# Patient Record
Sex: MALE | Race: WHITE | NOT HISPANIC OR LATINO | URBAN - METROPOLITAN AREA
[De-identification: names, ages, dates, MRNs, and addresses within clinical notes are randomized per-mention and may not be internally consistent; named-entity substitution may affect disease eponyms.]

---

## 2019-12-03 ENCOUNTER — OFFICE VISIT - HEALTHEAST (OUTPATIENT)
Dept: GERIATRICS | Facility: CLINIC | Age: 74
End: 2019-12-03

## 2019-12-03 DIAGNOSIS — J44.1 CHRONIC OBSTRUCTIVE PULMONARY DISEASE WITH ACUTE EXACERBATION (H): ICD-10-CM

## 2019-12-03 DIAGNOSIS — C34.11 MALIGNANT NEOPLASM OF UPPER LOBE OF RIGHT LUNG (H): ICD-10-CM

## 2019-12-03 DIAGNOSIS — E11.69 TYPE 2 DIABETES MELLITUS WITH OTHER SPECIFIED COMPLICATION, UNSPECIFIED WHETHER LONG TERM INSULIN USE (H): ICD-10-CM

## 2019-12-03 DIAGNOSIS — J96.21 ACUTE ON CHRONIC RESPIRATORY FAILURE WITH HYPOXIA AND HYPERCAPNIA (H): ICD-10-CM

## 2019-12-03 DIAGNOSIS — J96.22 ACUTE ON CHRONIC RESPIRATORY FAILURE WITH HYPOXIA AND HYPERCAPNIA (H): ICD-10-CM

## 2019-12-04 ENCOUNTER — AMBULATORY - HEALTHEAST (OUTPATIENT)
Dept: ADMINISTRATIVE | Facility: CLINIC | Age: 74
End: 2019-12-04

## 2019-12-04 ENCOUNTER — OFFICE VISIT - HEALTHEAST (OUTPATIENT)
Dept: GERIATRICS | Facility: CLINIC | Age: 74
End: 2019-12-04

## 2019-12-04 DIAGNOSIS — N18.30 TYPE 2 DIABETES MELLITUS WITH STAGE 3 CHRONIC KIDNEY DISEASE, WITHOUT LONG-TERM CURRENT USE OF INSULIN (H): ICD-10-CM

## 2019-12-04 DIAGNOSIS — E11.22 TYPE 2 DIABETES MELLITUS WITH STAGE 3 CHRONIC KIDNEY DISEASE, WITHOUT LONG-TERM CURRENT USE OF INSULIN (H): ICD-10-CM

## 2019-12-04 DIAGNOSIS — J96.22 ACUTE ON CHRONIC RESPIRATORY FAILURE WITH HYPOXIA AND HYPERCAPNIA (H): ICD-10-CM

## 2019-12-04 DIAGNOSIS — C34.11 MALIGNANT NEOPLASM OF UPPER LOBE OF RIGHT LUNG (H): ICD-10-CM

## 2019-12-04 DIAGNOSIS — J96.21 ACUTE ON CHRONIC RESPIRATORY FAILURE WITH HYPOXIA AND HYPERCAPNIA (H): ICD-10-CM

## 2019-12-04 RX ORDER — ASCORBIC ACID 500 MG
500 TABLET ORAL DAILY
Status: SHIPPED | COMMUNITY
Start: 2019-12-04

## 2019-12-04 RX ORDER — ALBUTEROL SULFATE 0.83 MG/ML
2.5 SOLUTION RESPIRATORY (INHALATION) EVERY 6 HOURS PRN
Status: SHIPPED | COMMUNITY
Start: 2019-12-04

## 2019-12-04 RX ORDER — CARVEDILOL 6.25 MG/1
6.25 TABLET ORAL 2 TIMES DAILY WITH MEALS
Status: SHIPPED | COMMUNITY
Start: 2019-12-04

## 2019-12-04 RX ORDER — VITAMIN E 268 MG
400 CAPSULE ORAL DAILY
Status: SHIPPED | COMMUNITY
Start: 2019-12-04

## 2019-12-04 RX ORDER — ACETAMINOPHEN 500 MG
500 TABLET ORAL 3 TIMES DAILY
Status: SHIPPED | COMMUNITY
Start: 2019-12-04

## 2019-12-04 RX ORDER — LISINOPRIL 2.5 MG/1
2.5 TABLET ORAL DAILY
Status: SHIPPED | COMMUNITY
Start: 2019-12-04

## 2019-12-10 ENCOUNTER — OFFICE VISIT - HEALTHEAST (OUTPATIENT)
Dept: GERIATRICS | Facility: CLINIC | Age: 74
End: 2019-12-10

## 2019-12-10 DIAGNOSIS — C34.11 MALIGNANT NEOPLASM OF UPPER LOBE OF RIGHT LUNG (H): ICD-10-CM

## 2019-12-10 DIAGNOSIS — J96.21 ACUTE ON CHRONIC RESPIRATORY FAILURE WITH HYPOXIA AND HYPERCAPNIA (H): ICD-10-CM

## 2019-12-10 DIAGNOSIS — E11.22 TYPE 2 DIABETES MELLITUS WITH STAGE 3 CHRONIC KIDNEY DISEASE, WITHOUT LONG-TERM CURRENT USE OF INSULIN (H): ICD-10-CM

## 2019-12-10 DIAGNOSIS — J44.1 CHRONIC OBSTRUCTIVE PULMONARY DISEASE WITH ACUTE EXACERBATION (H): ICD-10-CM

## 2019-12-10 DIAGNOSIS — J96.22 ACUTE ON CHRONIC RESPIRATORY FAILURE WITH HYPOXIA AND HYPERCAPNIA (H): ICD-10-CM

## 2019-12-10 DIAGNOSIS — N18.30 TYPE 2 DIABETES MELLITUS WITH STAGE 3 CHRONIC KIDNEY DISEASE, WITHOUT LONG-TERM CURRENT USE OF INSULIN (H): ICD-10-CM

## 2019-12-12 ENCOUNTER — OFFICE VISIT - HEALTHEAST (OUTPATIENT)
Dept: GERIATRICS | Facility: CLINIC | Age: 74
End: 2019-12-12

## 2019-12-12 DIAGNOSIS — Z79.4 TYPE 2 DIABETES MELLITUS WITH STAGE 3 CHRONIC KIDNEY DISEASE, WITH LONG-TERM CURRENT USE OF INSULIN (H): ICD-10-CM

## 2019-12-12 DIAGNOSIS — C34.11 MALIGNANT NEOPLASM OF UPPER LOBE OF RIGHT LUNG (H): ICD-10-CM

## 2019-12-12 DIAGNOSIS — N18.30 TYPE 2 DIABETES MELLITUS WITH STAGE 3 CHRONIC KIDNEY DISEASE, WITH LONG-TERM CURRENT USE OF INSULIN (H): ICD-10-CM

## 2019-12-12 DIAGNOSIS — J96.21 ACUTE ON CHRONIC RESPIRATORY FAILURE WITH HYPOXIA AND HYPERCAPNIA (H): ICD-10-CM

## 2019-12-12 DIAGNOSIS — E11.22 TYPE 2 DIABETES MELLITUS WITH STAGE 3 CHRONIC KIDNEY DISEASE, WITH LONG-TERM CURRENT USE OF INSULIN (H): ICD-10-CM

## 2019-12-12 DIAGNOSIS — J96.22 ACUTE ON CHRONIC RESPIRATORY FAILURE WITH HYPOXIA AND HYPERCAPNIA (H): ICD-10-CM

## 2019-12-12 RX ORDER — INSULIN GLARGINE 100 [IU]/ML
8 INJECTION, SOLUTION SUBCUTANEOUS AT BEDTIME
Status: SHIPPED | COMMUNITY
Start: 2019-12-12

## 2019-12-17 ENCOUNTER — OFFICE VISIT - HEALTHEAST (OUTPATIENT)
Dept: GERIATRICS | Facility: CLINIC | Age: 74
End: 2019-12-17

## 2019-12-17 DIAGNOSIS — J96.21 ACUTE ON CHRONIC RESPIRATORY FAILURE WITH HYPOXIA AND HYPERCAPNIA (H): ICD-10-CM

## 2019-12-17 DIAGNOSIS — C34.11 MALIGNANT NEOPLASM OF UPPER LOBE OF RIGHT LUNG (H): ICD-10-CM

## 2019-12-17 DIAGNOSIS — N18.30 TYPE 2 DIABETES MELLITUS WITH STAGE 3 CHRONIC KIDNEY DISEASE, WITH LONG-TERM CURRENT USE OF INSULIN (H): ICD-10-CM

## 2019-12-17 DIAGNOSIS — J96.22 ACUTE ON CHRONIC RESPIRATORY FAILURE WITH HYPOXIA AND HYPERCAPNIA (H): ICD-10-CM

## 2019-12-17 DIAGNOSIS — E11.22 TYPE 2 DIABETES MELLITUS WITH STAGE 3 CHRONIC KIDNEY DISEASE, WITH LONG-TERM CURRENT USE OF INSULIN (H): ICD-10-CM

## 2019-12-17 DIAGNOSIS — J44.1 CHRONIC OBSTRUCTIVE PULMONARY DISEASE WITH ACUTE EXACERBATION (H): ICD-10-CM

## 2019-12-17 DIAGNOSIS — Z79.4 TYPE 2 DIABETES MELLITUS WITH STAGE 3 CHRONIC KIDNEY DISEASE, WITH LONG-TERM CURRENT USE OF INSULIN (H): ICD-10-CM

## 2019-12-19 ENCOUNTER — OFFICE VISIT - HEALTHEAST (OUTPATIENT)
Dept: GERIATRICS | Facility: CLINIC | Age: 74
End: 2019-12-19

## 2019-12-19 DIAGNOSIS — I50.32 CHRONIC DIASTOLIC HEART FAILURE (H): ICD-10-CM

## 2019-12-19 DIAGNOSIS — C34.11 MALIGNANT NEOPLASM OF UPPER LOBE OF RIGHT LUNG (H): ICD-10-CM

## 2019-12-19 DIAGNOSIS — J96.21 ACUTE ON CHRONIC RESPIRATORY FAILURE WITH HYPOXIA AND HYPERCAPNIA (H): ICD-10-CM

## 2019-12-19 DIAGNOSIS — J96.22 ACUTE ON CHRONIC RESPIRATORY FAILURE WITH HYPOXIA AND HYPERCAPNIA (H): ICD-10-CM

## 2019-12-23 ENCOUNTER — AMBULATORY - HEALTHEAST (OUTPATIENT)
Dept: GERIATRICS | Facility: CLINIC | Age: 74
End: 2019-12-23

## 2021-06-03 NOTE — PROGRESS NOTES
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  He is found to have multiple complex comorbidities with end-stage COPD as well as lung cancer.  He also has chronic hypoxic respiratory failure with increasing shortness of breath currently discharged 5 L and pulmonary suspects that this may be his new baseline.  He has been given an empiric course of Augmentin for a 5-day regimen.  Also recommended a slow prednisone taper over 2 weeks.  Blood sugars to be monitored in the TCU they are elevated due to prednisone use.  Overall in light of his multiple comorbidities he is not felt to be a treatment candidate for squamous cell carcinoma of his lung.  Hospice care was recommended and patient did meet with hospice in the hospital but so far has deferred hospice.  Outpatient follow-up with pulmonology to discuss further treatment options recommended for him  He has been requested to start using nebulizers but with absolutely not use it as it makes him more hypoxic after treatments.  In addition Spiriva was recommended to be changed to Respimat MDI  He was also requested to start a Trelegy inhaler    An additional 16 minutes were spent reviewing goals of care and CODE STATUS.  He is currently requesting DNR/DNI with selective treatments however he is not ready for  Verified Results  (1) LIPID PANEL, FASTING 14Oct2017 08:09AM Nayla Wyatt     Test Name Result Flag Reference   CHOLESTEROL, TOTAL 167 mg/dL  <200   HDL CHOLESTEROL 55 mg/dL  >50   TRIGLICERIDES 418 mg/dL H <150   LDL-CHOLESTEROL 83 mg/dL (calc)     Reference range: <100     Desirable range <100 mg/dL for patients with CHD or  diabetes and <70 mg/dL for diabetic patients with  known heart disease  LDL-C is now calculated using the Reed-Nelson   calculation, which is a validated novel method providing   better accuracy than the Friedewald equation in the   estimation of LDL-C  Hilda Partida  Shanice Moore  5872;328(20): 8372-4379   (http://MSI/faq/QNR090)   CHOL/HDLC RATIO 3 0 (calc)  <5 0   NON HDL CHOLESTEROL 112 mg/dL (calc)  <130   For patients with diabetes plus 1 major ASCVD risk   factor, treating to a non-HDL-C goal of <100 mg/dL   (LDL-C of <70 mg/dL) is considered a therapeutic   option  (1) COMPREHENSIVE METABOLIC PANEL [id number] 45:83WN Nayla Wyatt     Test Name Result Flag Reference   GLUCOSE 113 mg/dL H 65-99   Fasting reference interval     For someone without known diabetes, a glucose value  between 100 and 125 mg/dL is consistent with  prediabetes and should be confirmed with a  follow-up test    UREA NITROGEN (BUN) 16 mg/dL  7-25   CREATININE 1 01 mg/dL  0 70-1 33   For patients >52years of age, the reference limit  for Creatinine is approximately 13% higher for people  identified as -American  eGFR NON-AFR   AMERICAN 86 mL/min/1 73m2  > OR = 60   eGFR AFRICAN AMERICAN 100 mL/min/1 73m2  > OR = 60   BUN/CREATININE RATIO   6-33   NOT APPLICABLE (calc)   SODIUM 139 mmol/L  135-146   POTASSIUM 4 7 mmol/L  3 5-5 3   CHLORIDE 103 mmol/L     CARBON DIOXIDE 29 mmol/L  20-31   CALCIUM 9 7 mg/dL  8 6-10 3   PROTEIN, TOTAL 7 2 g/dL  6 1-8 1   ALBUMIN 4 6 g/dL  3 6-5 1   GLOBULIN 2 6 g/dL (calc)  1 9-3 7   ALBUMIN/GLOBULIN RATIO 1 8 (calc)  1 0-2 5 BILIRUBIN, TOTAL 1 4 mg/dL H 0 2-1 2   ALKALINE PHOSPHATASE 51 U/L     AST 21 U/L  10-35   ALT 35 U/L  9-46     (Q) HEMOGLOBIN A1c 14Oct2017 08:09AM Shiela Chahal   REPORT COMMENT:  FASTING:YES     Test Name Result Flag Reference   HEMOGLOBIN A1c 5 9 % of total Hgb H <5 7   For someone without known diabetes, a hemoglobin   A1c value between 5 7% and 6 4% is consistent with  prediabetes and should be confirmed with a   follow-up test      For someone with known diabetes, a value <7%  indicates that their diabetes is well controlled  A1c  targets should be individualized based on duration of  diabetes, age, comorbid conditions, and other  considerations  This assay result is consistent with an increased risk  of diabetes  Currently, no consensus exists regarding use of  hemoglobin A1c for diagnosis of diabetes for children  hospice cares as yet  He is aware that he has end-stage COPD as well as lung cancer which is not being treated but would like to discuss further treatment options with his pulmonologist upon follow before making a decision  However family is currently looking at long-term care placement for him and also hospice cares.  Will await for care conference with     History     Patient is a very pleasant 74 y.o. male who is admitted to TCU  Patient presented with acute on chronic hypoxic respiratory failure.  He is on baseline 4 L supplemental oxygen at home.  He has end-stage COPD and sees pulmonary.  He was noted to have presumed COPD exacerbation in the hospital  He also has a recent diagnosis of squamous cell carcinoma of the right lung stage IIIa disease.  He was found to have a right upper lobe mass on CT done in August subsequently PET revealed a malignancy with suspected lymphangitic spread however as he is a marginal candidate he was not offered radiation treatment.  Hospice cares was recommended but patient has declined at present  Has a prior history of being a former smoker.  He is currently abstaining from smoking for the last few years  He was noted to have healthcare associated pneumonia and has been given IV antibiotics in the hospital currently given an empiric course of Augmentin for a 5-day treatment  Patient noted to have progressive shortness of breath especially worsened with exertion and it was felt the TCU would be beneficial to him he is been discharged here    Past Medical History       Past Medical History:   Diagnosis Date     Acute on chronic respiratory failure with hypoxia (H)      Anemia      BPH (benign prostatic hyperplasia)      Bronchiectasis (H)      Chronic diastolic heart failure (H)      Chronic venous insufficiency      Closed fracture of acetabulum (H)      Closed fracture of surgical neck of right humerus      COPD with acute exacerbation (H)      COPD with emphysema  (H)      DM2 (diabetes mellitus, type 2) (H)      Duodenal ulcer with hemorrhage      Gastrointestinal hemorrhage with melena      Hemorrhoids      Hernia of abdominal cavity      Hyperkalemia      Hyponatremia      Obesity      Pulmonary HTN (H)      Pulmonary nodule      Scrotal skin lesion      SOB (shortness of breath)      Squamous cell carcinoma lung (H)        Past Social History     Reviewed, and he  reports that he has quit smoking. He has a 45.00 pack-year smoking history. He has never used smokeless tobacco. He reports current alcohol use.    Family History     Reviewed, and includes MI in his father who  of heart disease in his 70s Sister had breast cancer    Medication List   Post Discharge Medication Reconciliation Status: discharge medications reconciled, continue medications without change   acetaminophen (TYLENOL) 500 MG tablet   Sig: Take 500 mg by mouth 3 (three) times a day.   Class: Historical Med   Route: Oral   albuterol (PROVENTIL) 2.5 mg /3 mL (0.083 %) nebulizer solution   Sig: Take 2.5 mg by nebulization every 6 (six) hours as needed for wheezing.   Class: Historical Med   Route: Nebulization   amoxicillin-clavulanate (AUGMENTIN) 875-125 mg per tablet   Sig: Take 1 tablet by mouth 2 (two) times a day.   Class: Historical Med   Route: Oral   carvedilol (COREG) 6.25 MG tablet   Sig: Take 6.25 mg by mouth 2 (two) times a day with meals.   Class: Historical Med   Route: Oral   lisinopril (PRINIVIL,ZESTRIL) 2.5 MG tablet   Sig: Take 2.5 mg by mouth daily.   Class: Historical Med   Route: Oral   metFORMIN (GLUCOPHAGE) 1000 MG tablet   Sig: Take 1,000 mg by mouth 2 (two) times a day with meals.   Class: Historical Med   Route: Oral   multivitamin (MULTIPLE VITAMIN ORAL)   Sig: Take 1 tablet by mouth daily.   Class: Historical Med   Route: Oral   predniSONE (DELTASONE) 20 MG tablet   Sig: Take 20 mg by mouth 2 (two) times a day.   Class: Historical Med   Route: Oral   predniSONE (DELTASONE) 20  MG tablet   Starting on: 12/10/2019   Sig: Take 20 mg by mouth daily.   Class: Historical Med   Route: Oral   fluticasone-umeclidinium-vilanterol (TRELEGY ELLIPTA) 100-62.5-25 mcg DsDv inhaler   Sig: Inhale 1 Inhalation daily.   Class: Historical Med   Route: Inhalation   ascorbic acid, vitamin C, (ASCORBIC ACID WITH CLEMENT HIPS) 500 MG tablet   Sig: Take 500 mg by mouth daily.   Class: Historical Med   Route: Oral   vitamin E 400 unit capsule   Sig: Take 400 Units by mouth daily.   Class: Historical Med   Route: Oral         Allergies     Allergies   Allergen Reactions     Aspirin      Other reaction(s): GI Bleeding  contraindication       Review of Systems   A comprehensive review of 14 systems was done. Pertinent findings noted here and in history of present illness. All the rest negative.  Constitutional: Negative.  Negative for fever, chills, he has profound activity change, appetite change and fatigue.   HENT: Negative for congestion and facial swelling.    Eyes: Negative for photophobia, redness and visual disturbance.   Respiratory: Negative for cough and chest tightness.  Quite anxious about his oxygen saturations he is refusing movement as he told me that it makes his oxygen saturation dropped and he did sit up he was checking his pulse oximetry  Cardiovascular: Negative for chest pain, palpitations and leg swelling.   Gastrointestinal: Negative for nausea, diarrhea, constipation, blood in stool and abdominal distention.   Genitourinary: Negative.    Musculoskeletal: Negative.  Weak with limited endurance laying in bed refusing to get up as it drops his oxygen saturation  Skin: Negative.    Neurological: Negative for dizziness, tremors, syncope, weakness, light-headedness and headaches.   Hematological: Does not bruise/bleed easily.   Psychiatric/Behavioral: Negative.  Irritable and affect      Physical Exam     Weight is 180 pounds blood pressure 133/78 temp 98 pulse 89    Constitutional: Oriented to  person, place, and time and appears well-developed.   Disabled in appearance  Patient appears to be in moderate respiratory distress he is ranging from 6 to 9 L of oxygen by nasal cannula in the TCU.  Noted to be pursing his mouth to breathe  HEENT:  Normocephalic and atraumatic.  Eyes: Conjunctivae and EOM are normal. Pupils are equal, round, and reactive to light. No discharge.  No scleral icterus. Nose normal. Mouth/Throat: Oropharynx is clear and moist. No oropharyngeal exudate.    NECK: Normal range of motion. Neck supple. No JVD present. No tracheal deviation present. No thyromegaly present.   CARDIOVASCULAR: Normal rate, regular rhythm and intact distal pulses.  Exam reveals no gallop and no friction rub.  Systolic murmur present.  PULMONARY: Effort normal and breath sounds normal. No respiratory distress.No Wheezing or rales.  Diffuse scattered wheezing expiratory especially in the Rt lung noted  ABDOMEN: Soft. Bowel sounds are normal. No distension and no mass.  There is no tenderness. There is no rebound and no guarding. No HSM.  MUSCULOSKELETAL: Normal range of motion. No edema and no tenderness. Mild kyphosis, no tenderness.  LYMPH NODES: Has no cervical, supraclavicular, axillary and groin adenopathy.   NEUROLOGICAL: Alert and oriented to person, place, and time. No cranial nerve deficit.  Normal muscle tone. Coordination normal.   GENITOURINARY: Deferred exam.  SKIN: Skin is warm and dry. No rash noted. No erythema. No pallor.   EXTREMITIES: No cyanosis, no clubbing, no edema. No Deformity.  PSYCHIATRIC: Normal mood, affect and behavior.  Affect is irritable      Lab Results     X-ray chest 11/29/2019 shows right upper lobe mass with significant increase in size interstitial prominence around the tumor concerning for lymphangitic spread versus airspace disease  Bibasilar effusion  Last BMP shows a sodium of 1 2 potassium 5.2 with a chloride of 85 BUN 12 and creatinine of 0.8        GARIMA Butt

## 2021-06-04 VITALS — TEMPERATURE: 96 F | DIASTOLIC BLOOD PRESSURE: 84 MMHG | WEIGHT: 174.6 LBS | SYSTOLIC BLOOD PRESSURE: 139 MMHG

## 2021-06-04 VITALS
TEMPERATURE: 98 F | DIASTOLIC BLOOD PRESSURE: 67 MMHG | WEIGHT: 173.2 LBS | HEART RATE: 97 BPM | SYSTOLIC BLOOD PRESSURE: 105 MMHG

## 2021-06-04 VITALS
HEART RATE: 50 BPM | WEIGHT: 172.4 LBS | DIASTOLIC BLOOD PRESSURE: 72 MMHG | TEMPERATURE: 98.7 F | SYSTOLIC BLOOD PRESSURE: 111 MMHG

## 2021-06-04 NOTE — PROGRESS NOTES
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  He is found to have multiple complex comorbidities with end-stage COPD as well as lung cancer.  Unfortunately remains profoundly debilitated  He is currently bedbound and poorly ambulatory.  Unfortunately extreme fatigue and limited endurance is limited his ability to progress in therapy he states that even minimal movement can cause him extreme degree of shortness of breath and drop in oxygen saturation.  Blood sugars continue to be uncontrolled with blood sugars ranging from 200 to as high as 400 noted in the TCU.  He has refused to take metformin.  He is not a candidate for tight control.  He wanted to review his inhalers and we did look at his Trelegy inhaler as he is quite upset that history was discontinued and we is was reassured that this was not.  I did show him the label on his Trelegy inhaler.  Discharge planning reviewed with him and he is hoping to discharge to assisted living facility soon we are recommending hospice cares for him    History     Patient is a very pleasant 74 y.o. male  who is admitted to TCU  Patient presented with acute on chronic hypoxic respiratory failure.  He is on baseline 4 L supplemental oxygen at home.  Unfortunately has progressive respiratory failure and now on anywhere from 6 to 8 L of oxygen patient is self-monitoring his oxygen saturations  He has end-stage COPD and sees pulmonary.  He was noted to have presumed COPD exacerbation in the hospital  He also has a recent diagnosis of squamous cell carcinoma of the right lung stage IIIa disease.  He was found to have a right upper lobe mass on CT done in August subsequently PET revealed a malignancy with suspected lymphangitic spread however as he is a marginal candidate he was not offered radiation treatment.  Hospice cares was recommended but patient has declined at present  He is not aware when he is supposed to follow with his primary pulmonologist  Has a prior history of being a former smoker.  He is currently abstaining from smoking for the last few years quite upset that his very well was discontinued we did review his the hospital discharge orders and the fact that he is on Trelegy  He was noted to have healthcare associated pneumonia and has been given IV antibiotics in the hospital currently given an empiric course of Augmentin for a 5-day treatment currently he is done with his antibiotics  Patient noted to have progressive weight loss.  He reports that his oral intake is good.  In addition he has diabetes with blood sugars which are ranging from 200-4 00+ he is on insulin unfortunately is being poorly controlled at present.  He told me he does not want to take his metformin at all.  He is looking at an assisted living discharge planning    Past Medical History       Past Medical History:   Diagnosis Date     Acute on chronic respiratory failure with hypoxia (H)      Anemia      BPH (benign prostatic hyperplasia)      Bronchiectasis (H)      Chronic diastolic heart failure (H)      Chronic venous insufficiency       Closed fracture of acetabulum (H)      Closed fracture of surgical neck of right humerus      COPD with acute exacerbation (H)      COPD with emphysema (H)      DM2 (diabetes mellitus, type 2) (H)      Duodenal ulcer with hemorrhage      Gastrointestinal hemorrhage with melena      Hemorrhoids      Hernia of abdominal cavity      Hyperkalemia      Hyponatremia      Obesity      Pulmonary HTN (H)      Pulmonary nodule      Scrotal skin lesion      SOB (shortness of breath)      Squamous cell carcinoma lung (H)        Past Social History     Reviewed, and he  reports that he has quit smoking. He has a 45.00 pack-year smoking history. He has never used smokeless tobacco. He reports current alcohol use.    Family History     Reviewed, and includes MI in his father who  of heart disease in his 70s Sister had breast cancer    Medication List   Post Discharge Medication Reconciliation Status: discharge medications reconciled, continue medications without change   acetaminophen (TYLENOL) 500 MG tablet   Sig: Take 500 mg by mouth 3 (three) times a day.   Class: Historical Med   Route: Oral   albuterol (PROVENTIL) 2.5 mg /3 mL (0.083 %) nebulizer solution   Sig: Take 2.5 mg by nebulization every 6 (six) hours as needed for wheezing.   Class: Historical Med   Route: Nebulization   amoxicillin-clavulanate (AUGMENTIN) 875-125 mg per tablet   Sig: Take 1 tablet by mouth 2 (two) times a day.   Class: Historical Med   Route: Oral   carvedilol (COREG) 6.25 MG tablet   Sig: Take 6.25 mg by mouth 2 (two) times a day with meals.   Class: Historical Med   Route: Oral   lisinopril (PRINIVIL,ZESTRIL) 2.5 MG tablet   Sig: Take 2.5 mg by mouth daily.   Class: Historical Med   Route: Oral   metFORMIN (GLUCOPHAGE) 1000 MG tablet   Sig: Take 1,000 mg by mouth 2 (two) times a day with meals.   Class: Historical Med   Route: Oral   multivitamin (MULTIPLE VITAMIN ORAL)   Sig: Take 1 tablet by mouth daily.   Class: Historical Med   Route:  Oral   predniSONE (DELTASONE) 20 MG tablet   Sig: Take 20 mg by mouth 2 (two) times a day.   Class: Historical Med   Route: Oral   predniSONE (DELTASONE) 20 MG tablet   Starting on: 12/10/2019   Sig: Take 20 mg by mouth daily.   Class: Historical Med   Route: Oral   fluticasone-umeclidinium-vilanterol (TRELEGY ELLIPTA) 100-62.5-25 mcg DsDv inhaler   Sig: Inhale 1 Inhalation daily.   Class: Historical Med   Route: Inhalation   ascorbic acid, vitamin C, (ASCORBIC ACID WITH CLEMENT HIPS) 500 MG tablet   Sig: Take 500 mg by mouth daily.   Class: Historical Med   Route: Oral   vitamin E 400 unit capsule   Sig: Take 400 Units by mouth daily.   Class: Historical Med   Route: Oral         Allergies     Allergies   Allergen Reactions     Aspirin      Other reaction(s): GI Bleeding  contraindication       Review of Systems   A comprehensive review of 14 systems was done. Pertinent findings noted here and in history of present illness. All the rest negative.  Constitutional: Negative.  Negative for fever, chills, he has profound activity change, appetite change and fatigue.   HENT: Negative for congestion and facial swelling.    Eyes: Negative for photophobia, redness and visual disturbance.   Respiratory: Negative for cough and chest tightness.  Quite anxious about his oxygen saturations he is refusing movement as he told me that it makes his oxygen saturation dropped and he did sit up he was checking his pulse oximetry  Cardiovascular: Negative for chest pain, palpitations and leg swelling.   Gastrointestinal: Negative for nausea, diarrhea, constipation, blood in stool and abdominal distention.   Genitourinary: Negative.    Musculoskeletal: Negative.  Weak with limited endurance laying in bed refusing to get up as it drops his oxygen saturation  Skin: Negative.    Neurological: Negative for dizziness, tremors, syncope, weakness, light-headedness and headaches.   Hematological: Does not bruise/bleed easily.    Psychiatric/Behavioral: Negative.  Irritable and affect      Physical Exam     Weight is 173 pounds blood pressure 99 / 54  temp 98 pulse 89    Constitutional: Oriented to person, place, and time and appears well-developed.   Disabled in appearance  Patient appears to be in moderate respiratory distress he is ranging from 6 to 9 L of oxygen by nasal cannula in the TCU.  Noted to be pursing his mouth to breathe  HEENT:  Normocephalic and atraumatic.  Eyes: Conjunctivae and EOM are normal. Pupils are equal, round, and reactive to light. No discharge.  No scleral icterus. Nose normal. Mouth/Throat: Oropharynx is clear and moist. No oropharyngeal exudate.    NECK: Normal range of motion. Neck supple. No JVD present. No tracheal deviation present. No thyromegaly present.   CARDIOVASCULAR: Normal rate, regular rhythm and intact distal pulses.  Exam reveals no gallop and no friction rub.  Systolic murmur present.  PULMONARY: Effort normal and breath sounds normal. No respiratory distress.No Wheezing or rales.  Diffuse scattered wheezing expiratory especially in the Rt lung noted  ABDOMEN: Soft. Bowel sounds are normal. No distension and no mass.  There is no tenderness. There is no rebound and no guarding. No HSM.  MUSCULOSKELETAL: Normal range of motion. No edema and no tenderness. Mild kyphosis, no tenderness.  LYMPH NODES: Has no cervical, supraclavicular, axillary and groin adenopathy.   NEUROLOGICAL: Alert and oriented to person, place, and time. No cranial nerve deficit.  Normal muscle tone. Coordination normal.   GENITOURINARY: Deferred exam.  SKIN: Skin is warm and dry. No rash noted. No erythema. No pallor.   EXTREMITIES: No cyanosis, no clubbing, no edema. No Deformity.  PSYCHIATRIC: Normal mood, affect and behavior.  Affect is irritable      Lab Results     X-ray chest 11/29/2019 shows right upper lobe mass with significant increase in size interstitial prominence around the tumor concerning for lymphangitic  spread versus airspace disease  Bibasilar effusion  Last BMP shows a sodium of 1 2 potassium 5.2 with a chloride of 85 BUN 12 and creatinine of 0.8        GARIMA Butt

## 2021-06-04 NOTE — PROGRESS NOTES
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  He is found to have multiple complex comorbidities with end-stage COPD as well as lung cancer.  Unfortunately remains profoundly debilitated  He is currently done with his antibiotic but remains on a slow prednisone taper.  He requires anywhere from 6 to 8 L of oxygen due to persistent hypoxia.  Baseline functional capacity is limited and he gets deconditioned and short of breath very easily.  He has had persistent weight loss he has been started on supplementation with Glucerna due to which he has been started on Glucerna  Diabetes control is very poor with persistent high blood sugars noted.  He has refused to take metformin  We will empirically start him on Lantus 5 units in the morning in addition to sliding scale coverage to assess control he is not a candidate for tight control but would like to bring all his blood sugars under 200  History     Patient is a very pleasant 74 y.o. male who is admitted to TCU  Patient presented with acute on chronic hypoxic respiratory failure.  He is on baseline 4  L supplemental oxygen at home.  He has end-stage COPD and sees pulmonary.  He was noted to have presumed COPD exacerbation in the hospital  He also has a recent diagnosis of squamous cell carcinoma of the right lung stage IIIa disease.  He was found to have a right upper lobe mass on CT done in August subsequently PET revealed a malignancy with suspected lymphangitic spread however as he is a marginal candidate he was not offered radiation treatment.  Hospice cares was recommended but patient has declined at present  He is not aware when he is supposed to follow with his primary pulmonologist  Has a prior history of being a former smoker.  He is currently abstaining from smoking for the last few years  He was noted to have healthcare associated pneumonia and has been given IV antibiotics in the hospital currently given an empiric course of Augmentin for a 5-day treatment currently he is done with his antibiotics  Patient noted to have progressive shortness of breath especially worsened with exertion and it was felt the TCU would be beneficial to him he is been discharged here  No improvement noted and hypoxia he needs anywhere from 6 to 8 L of oxygen now and remains bedbound  He has had progressive weight loss since admission even though his appetite has been good  Diabetic control is very poor and he is refusing to take his metformin due to diarrhea concerns and stomach upset concerns    Past Medical History       Past Medical History:   Diagnosis Date     Acute on chronic respiratory failure with hypoxia (H)      Anemia      BPH (benign prostatic hyperplasia)      Bronchiectasis (H)      Chronic diastolic heart failure (H)      Chronic venous insufficiency      Closed fracture of acetabulum (H)      Closed fracture of surgical neck of right humerus      COPD with acute exacerbation (H)      COPD with emphysema (H)      DM2 (diabetes mellitus, type 2) (H)      Duodenal ulcer with hemorrhage      Gastrointestinal  hemorrhage with melena      Hemorrhoids      Hernia of abdominal cavity      Hyperkalemia      Hyponatremia      Obesity      Pulmonary HTN (H)      Pulmonary nodule      Scrotal skin lesion      SOB (shortness of breath)      Squamous cell carcinoma lung (H)        Past Social History     Reviewed, and he  reports that he has quit smoking. He has a 45.00 pack-year smoking history. He has never used smokeless tobacco. He reports current alcohol use.    Family History     Reviewed, and includes MI in his father who  of heart disease in his 70s Sister had breast cancer    Medication List   Post Discharge Medication Reconciliation Status: discharge medications reconciled, continue medications without change   acetaminophen (TYLENOL) 500 MG tablet   Sig: Take 500 mg by mouth 3 (three) times a day.   Class: Historical Med   Route: Oral   albuterol (PROVENTIL) 2.5 mg /3 mL (0.083 %) nebulizer solution   Sig: Take 2.5 mg by nebulization every 6 (six) hours as needed for wheezing.   Class: Historical Med   Route: Nebulization   amoxicillin-clavulanate (AUGMENTIN) 875-125 mg per tablet   Sig: Take 1 tablet by mouth 2 (two) times a day.   Class: Historical Med   Route: Oral   carvedilol (COREG) 6.25 MG tablet   Sig: Take 6.25 mg by mouth 2 (two) times a day with meals.   Class: Historical Med   Route: Oral   lisinopril (PRINIVIL,ZESTRIL) 2.5 MG tablet   Sig: Take 2.5 mg by mouth daily.   Class: Historical Med   Route: Oral   metFORMIN (GLUCOPHAGE) 1000 MG tablet   Sig: Take 1,000 mg by mouth 2 (two) times a day with meals.   Class: Historical Med   Route: Oral   multivitamin (MULTIPLE VITAMIN ORAL)   Sig: Take 1 tablet by mouth daily.   Class: Historical Med   Route: Oral   predniSONE (DELTASONE) 20 MG tablet   Sig: Take 20 mg by mouth 2 (two) times a day.   Class: Historical Med   Route: Oral   predniSONE (DELTASONE) 20 MG tablet   Starting on: 12/10/2019   Sig: Take 20 mg by mouth daily.   Class: Historical Med   Route:  Oral   fluticasone-umeclidinium-vilanterol (TRELEGY ELLIPTA) 100-62.5-25 mcg DsDv inhaler   Sig: Inhale 1 Inhalation daily.   Class: Historical Med   Route: Inhalation   ascorbic acid, vitamin C, (ASCORBIC ACID WITH CLEMENT HIPS) 500 MG tablet   Sig: Take 500 mg by mouth daily.   Class: Historical Med   Route: Oral   vitamin E 400 unit capsule   Sig: Take 400 Units by mouth daily.   Class: Historical Med   Route: Oral         Allergies     Allergies   Allergen Reactions     Aspirin      Other reaction(s): GI Bleeding  contraindication       Review of Systems   A comprehensive review of 14 systems was done. Pertinent findings noted here and in history of present illness. All the rest negative.  Constitutional: Negative.  Negative for fever, chills, he has profound activity change, appetite change and fatigue.   HENT: Negative for congestion and facial swelling.    Eyes: Negative for photophobia, redness and visual disturbance.   Respiratory: Negative for cough and chest tightness.  Quite anxious about his oxygen saturations he is refusing movement as he told me that it makes his oxygen saturation dropped and he did sit up he was checking his pulse oximetry  Cardiovascular: Negative for chest pain, palpitations and leg swelling.   Gastrointestinal: Negative for nausea, diarrhea, constipation, blood in stool and abdominal distention.   Genitourinary: Negative.    Musculoskeletal: Negative.  Weak with limited endurance laying in bed refusing to get up as it drops his oxygen saturation  Skin: Negative.    Neurological: Negative for dizziness, tremors, syncope, weakness, light-headedness and headaches.   Hematological: Does not bruise/bleed easily.   Psychiatric/Behavioral: Negative.  Irritable and affect      Physical Exam     Weight is 173 pounds blood pressure 104/ 56  temp 98 pulse 89    Constitutional: Oriented to person, place, and time and appears well-developed.   Disabled in appearance  Patient appears to be in  moderate respiratory distress he is ranging from 6 to 9 L of oxygen by nasal cannula in the TCU.  Noted to be pursing his mouth to breathe  HEENT:  Normocephalic and atraumatic.  Eyes: Conjunctivae and EOM are normal. Pupils are equal, round, and reactive to light. No discharge.  No scleral icterus. Nose normal. Mouth/Throat: Oropharynx is clear and moist. No oropharyngeal exudate.    NECK: Normal range of motion. Neck supple. No JVD present. No tracheal deviation present. No thyromegaly present.   CARDIOVASCULAR: Normal rate, regular rhythm and intact distal pulses.  Exam reveals no gallop and no friction rub.  Systolic murmur present.  PULMONARY: Effort normal and breath sounds normal. No respiratory distress.No Wheezing or rales.  Diffuse scattered wheezing expiratory especially in the Rt lung noted  ABDOMEN: Soft. Bowel sounds are normal. No distension and no mass.  There is no tenderness. There is no rebound and no guarding. No HSM.  MUSCULOSKELETAL: Normal range of motion. No edema and no tenderness. Mild kyphosis, no tenderness.  LYMPH NODES: Has no cervical, supraclavicular, axillary and groin adenopathy.   NEUROLOGICAL: Alert and oriented to person, place, and time. No cranial nerve deficit.  Normal muscle tone. Coordination normal.   GENITOURINARY: Deferred exam.  SKIN: Skin is warm and dry. No rash noted. No erythema. No pallor.   EXTREMITIES: No cyanosis, no clubbing, no edema. No Deformity.  PSYCHIATRIC: Normal mood, affect and behavior.  Affect is irritable      Lab Results     X-ray chest 11/29/2019 shows right upper lobe mass with significant increase in size interstitial prominence around the tumor concerning for lymphangitic spread versus airspace disease  Bibasilar effusion  Last BMP shows a sodium of 1 2 potassium 5.2 with a chloride of 85 BUN 12 and creatinine of 0.8        GARIMA Butt

## 2021-06-04 NOTE — PROGRESS NOTES
"  [facility]      NAME:  Jj Clements             :  1945    MRN: [id number]    CODE STATUS:  DNR    FACILITY: [facility] [[id number]]      CHIEF COMPLAIN/REASON FOR VISIT:  Chief Complaint   Patient presents with     Review Of Multiple Medical Conditions       HISTORY OF PRESENT ILLNESS: Jj Clements is a 74 y.o. male being seen today for review of multiple medical conditions. He was recently at [facility]. 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 He is finishing a course of Augmentin. He has been refusing his metformin, he feels it is causing his loose stools, reports had at home in past. May also be Augmentin but pt insistent not taking his metformin due to loose stools, reviewed this again with pt. BS as high as 371. .Reviewed BS,he is on Lantus now at 5 units, we will adjust. He reports he hopes to have dc by . Will discuss with SW as placement he will require insulin help.    Allergies   Allergen Reactions     Aspirin      Other reaction(s): GI Bleeding  contraindication   :     Current Outpatient Medications   Medication Sig     insulin glargine (LANTUS) 100 unit/mL vial Inject 8 Units under the skin at bedtime.     acetaminophen (TYLENOL) 500 MG tablet Take 500 mg by mouth 3 (three) times a day.     albuterol (PROVENTIL) 2.5 mg /3 mL (0.083 %) nebulizer solution Take 2.5 mg by nebulization every 6 (six) hours as needed for wheezing.     ascorbic acid, vitamin C, (ASCORBIC ACID WITH CLEMENT HIPS) 500 MG tablet Take 500 mg by mouth daily.     carvedilol (COREG) 6.25 MG " tablet Take 6.25 mg by mouth 2 (two) times a day with meals.     fluticasone-umeclidinium-vilanterol (TRELEGY ELLIPTA) 100-62.5-25 mcg DsDv inhaler Inhale 1 Inhalation daily.     lisinopril (PRINIVIL,ZESTRIL) 2.5 MG tablet Take 2.5 mg by mouth daily.     multivitamin (MULTIPLE VITAMIN ORAL) Take 1 tablet by mouth daily.     predniSONE (DELTASONE) 20 MG tablet Take 20 mg by mouth daily.     vitamin E 400 unit capsule Take 400 Units by mouth daily.         REVIEW OF SYSTEMS:    Currently, no fever, chills, or rigors. Does not have any visual or hearing problems. Denies any chest pain, headaches, palpitations, lightheadedness, dizziness, shortness of breath, or cough. Appetite is good. Denies any GERD symptoms. Denies any difficulty with swallowing, nausea, or vomiting.  Denies any abdominal pain, or constipation, but has loose stool. Denies any urinary symptoms. No insomnia. No active bleeding. No rash.       PHYSICAL EXAMINATION:  Vitals:    12/12/19 2043   BP: 111/72   Pulse: (!) 50   Temp: 98.7  F (37.1  C)   Weight: 172 lb 6.4 oz (78.2 kg)         GENERAL: Awake, Alert, oriented x3, not in any form of acute distress, answers questions appropriately, follows simple commands, conversant  HEENT: Head is normocephalic with normal hair distribution. No evidence of trauma. Ears: No acute purulent discharge. Eyes: Conjunctivae pink with no scleral jaundice. Nose: Normal mucosa and septum. NECK: Supple with no cervical or supraclavicular lymphadenopathy. Trachea is midline.   CHEST: No tenderness or deformity, no crepitus  LUNG: Clear to auscultation with good chest expansion. DM BS at bases, 02 4-6 liters  BACK: No kyphosis of the thoracic spine. Symmetric, no curvature, ROM normal, no CVA tenderness, no spinal tenderness   CVS: There is good S1  S2, there are no murmurs, rhythm is regular.  ABDOMEN: Globular and soft, nontender to palpation, non distended, no masses, no organomegaly, good bowel sounds, no rebound or  guarding, no peritoneal signs.   EXTREMITIES: Atraumatic. Full range of motion on both upper and lower extremities, there is no tenderness to palpation, no pedal edema, no cyanosis or clubbing, no calf tenderness, normal cap refill, no joint swelling.  SKIN: Warm and dry, no erythema noted, no rashes or lesions.  NEUROLOGICAL: The patient is oriented to person, place and time. Strength and sensation are grossly intact. Face is symmetric.                    LABS:    No results found for: WBC, HGB, HCT, MCV, PLT    No results found for this or any previous visit.        No results found for: HGBA1C  No results found for: [id number]  No results found for: APGWWHQD10    ASSESSMENT/PLAN:  1. Malignant neoplasm of upper lobe of right lung (H)    2. Acute on chronic respiratory failure with hypoxia and hypercapnia (H)    3. Type 2 diabetes mellitus with stage 3 chronic kidney disease, with long-term current use of insulin (H)        1/2 Respiratory failure and lung CA: Pt remains on 02 at 6 l nc, dysemic with exertion. In TCU for rehab services with PT/OT and SN services for chronic medical conditions.    3. DM: Still refusing metformin. We did dc and start SS at this time, lino OLIVEIRA had added Lantus 5 units Q am, sugars are between 171-367, we will increase Lantus up to 8 units. SN to continue to monitor.    Electronically signed by:  Fe Alvarado CNP  This progress note was completed using Dragon software and there may be grammatical errors.

## 2021-06-04 NOTE — PROGRESS NOTES
"  [facility]      NAME:  Jj Clements             :  1945    MRN: [id number]    CODE STATUS:  DNR    FACILITY: [facility] [[id number]]      CHIEF COMPLAIN/REASON FOR VISIT:  Chief Complaint   Patient presents with     Review Of Multiple Medical Conditions       HISTORY OF PRESENT ILLNESS: Jj Clements is a 74 y.o. male being seen today for review of multiple medical conditions. He was recently at [facility]. 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 He is finishing a course of Augmentin. He has been refusing his metformin, he feels it is causing his loose stools, reports had at home in past. May also be Augmentin but pt insistent not taking his metformin due to loose stools.Reviewed BS, he agreed to try SS Novolog insulin. We will follow BS closely and may be able to dc.      Allergies   Allergen Reactions     Aspirin      Other reaction(s): GI Bleeding  contraindication   :     Current Outpatient Medications   Medication Sig     acetaminophen (TYLENOL) 500 MG tablet Take 500 mg by mouth 3 (three) times a day.     albuterol (PROVENTIL) 2.5 mg /3 mL (0.083 %) nebulizer solution Take 2.5 mg by nebulization every 6 (six) hours as needed for wheezing.     amoxicillin-clavulanate (AUGMENTIN) 875-125 mg per tablet Take 1 tablet by mouth 2 (two) times a day.     ascorbic acid, vitamin C, (ASCORBIC ACID WITH CLEMENT HIPS) 500 MG tablet Take 500 mg by mouth daily.     carvedilol (COREG) 6.25 MG tablet Take 6.25 mg by mouth 2 (two) times a day with meals.     " fluticasone-umeclidinium-vilanterol (TRELEGY ELLIPTA) 100-62.5-25 mcg DsDv inhaler Inhale 1 Inhalation daily.     lisinopril (PRINIVIL,ZESTRIL) 2.5 MG tablet Take 2.5 mg by mouth daily.     multivitamin (MULTIPLE VITAMIN ORAL) Take 1 tablet by mouth daily.     predniSONE (DELTASONE) 20 MG tablet Take 20 mg by mouth 2 (two) times a day.     [START ON 12/10/2019] predniSONE (DELTASONE) 20 MG tablet Take 20 mg by mouth daily.     vitamin E 400 unit capsule Take 400 Units by mouth daily.         REVIEW OF SYSTEMS:    Currently, no fever, chills, or rigors. Does not have any visual or hearing problems. Denies any chest pain, headaches, palpitations, lightheadedness, dizziness, shortness of breath, or cough. Appetite is good. Denies any GERD symptoms. Denies any difficulty with swallowing, nausea, or vomiting.  Denies any abdominal pain, or constipation, but has loose stool. Denies any urinary symptoms. No insomnia. No active bleeding. No rash.       PHYSICAL EXAMINATION:  Vitals:    12/05/19 0453   BP: 139/84   Temp: (!) 96  F (35.6  C)   Weight: 174 lb 9.6 oz (79.2 kg)         GENERAL: Awake, Alert, oriented x3, not in any form of acute distress, answers questions appropriately, follows simple commands, conversant  HEENT: Head is normocephalic with normal hair distribution. No evidence of trauma. Ears: No acute purulent discharge. Eyes: Conjunctivae pink with no scleral jaundice. Nose: Normal mucosa and septum. NECK: Supple with no cervical or supraclavicular lymphadenopathy. Trachea is midline.   CHEST: No tenderness or deformity, no crepitus  LUNG: Clear to auscultation with good chest expansion. DM BS at bases, 02 4-6 liters  BACK: No kyphosis of the thoracic spine. Symmetric, no curvature, ROM normal, no CVA tenderness, no spinal tenderness   CVS: There is good S1  S2, there are no murmurs, rhythm is regular.  ABDOMEN: Globular and soft, nontender to palpation, non distended, no masses, no organomegaly, good bowel  sounds, no rebound or guarding, no peritoneal signs.   EXTREMITIES: Atraumatic. Full range of motion on both upper and lower extremities, there is no tenderness to palpation, no pedal edema, no cyanosis or clubbing, no calf tenderness, normal cap refill, no joint swelling.  SKIN: Warm and dry, no erythema noted, no rashes or lesions.  NEUROLOGICAL: The patient is oriented to person, place and time. Strength and sensation are grossly intact. Face is symmetric.                    LABS:    No results found for: WBC, HGB, HCT, MCV, PLT    No results found for this or any previous visit.        No results found for: HGBA1C  No results found for: [id number]  No results found for: [id number]    ASSESSMENT/PLAN:  1. Acute on chronic respiratory failure with hypoxia and hypercapnia (H)    2. Malignant neoplasm of upper lobe of right lung (H)    3. Type 2 diabetes mellitus with stage 3 chronic kidney disease, without long-term current use of insulin (H)        1/2> Respiratory failure and lung CA: Pt remains on 02 at 6 l nc, dysemic with exertion. In TCU for rehab services with PT/OT and SN services for chronic medical conditions.    3. DM: Refusing metformin. We will dc and start SS at this time, closely monitor his BS closely    Electronically signed by:  Fe Alvarado CNP  This progress note was completed using Dragon software and there may be grammatical errors.

## 2021-06-16 PROBLEM — E11.9 DM2 (DIABETES MELLITUS, TYPE 2) (H): Status: ACTIVE | Noted: 2019-12-05

## 2021-06-16 PROBLEM — J96.11 CHRONIC RESPIRATORY FAILURE WITH HYPOXIA (H): Status: ACTIVE | Noted: 2017-01-17

## 2021-06-16 PROBLEM — J96.22 ACUTE ON CHRONIC RESPIRATORY FAILURE WITH HYPOXIA AND HYPERCAPNIA (H): Status: ACTIVE | Noted: 2019-12-04

## 2021-06-16 PROBLEM — C34.11 MALIGNANT NEOPLASM OF UPPER LOBE OF RIGHT LUNG (H): Status: ACTIVE | Noted: 2019-12-04

## 2021-06-16 PROBLEM — J96.21 ACUTE ON CHRONIC RESPIRATORY FAILURE WITH HYPOXIA AND HYPERCAPNIA (H): Status: ACTIVE | Noted: 2019-12-04

## 2021-06-16 PROBLEM — I50.32 CHRONIC DIASTOLIC HEART FAILURE (H): Status: ACTIVE | Noted: 2019-12-20

## 2021-06-19 NOTE — LETTER
"Letter by Fe Alvaardo CNP at      Author: Fe Alvarado CNP Service: -- Author Type: --    Filed:  Encounter Date: 2019 Status: Signed         Patient: Jj Clements   MR Number: [id number]   YOB: 1945   Date of Visit: 2019       [facility]      NAME:  Jj Clements             :  1945    MRN: [id number]    CODE STATUS:  DNR    FACILITY: [facility] [[id number]]      CHIEF COMPLAIN/REASON FOR VISIT:  Chief Complaint   Patient presents with   ? Review Of Multiple Medical Conditions       HISTORY OF PRESENT ILLNESS: Jj Clements is a 74 y.o. male being seen today for review of multiple medical conditions. He was recently at [facility]. 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 He is finishing a course of Augmentin. He has been refusing his metformin, he feels it is causing his loose stools, reports had at home in past. May also be Augmentin but pt insistent not taking his metformin due to loose stools.Reviewed BS, he agreed to try SS Novolog insulin. We will follow BS closely and may be able to dc.      Allergies   Allergen Reactions   ? Aspirin      Other reaction(s): GI Bleeding  contraindication   :     Current Outpatient Medications   Medication Sig   ? acetaminophen (TYLENOL) 500 MG tablet Take 500 mg by mouth 3 (three) times a day.   ? albuterol (PROVENTIL) 2.5 mg /3 mL (0.083 %) nebulizer solution Take 2.5 mg by nebulization every 6 (six) hours as needed for wheezing.   ? amoxicillin-clavulanate (AUGMENTIN) 875-125 mg per tablet Take 1 " tablet by mouth 2 (two) times a day.   ? ascorbic acid, vitamin C, (ASCORBIC ACID WITH CLEMENT HIPS) 500 MG tablet Take 500 mg by mouth daily.   ? carvedilol (COREG) 6.25 MG tablet Take 6.25 mg by mouth 2 (two) times a day with meals.   ? fluticasone-umeclidinium-vilanterol (TRELEGY ELLIPTA) 100-62.5-25 mcg DsDv inhaler Inhale 1 Inhalation daily.   ? lisinopril (PRINIVIL,ZESTRIL) 2.5 MG tablet Take 2.5 mg by mouth daily.   ? multivitamin (MULTIPLE VITAMIN ORAL) Take 1 tablet by mouth daily.   ? predniSONE (DELTASONE) 20 MG tablet Take 20 mg by mouth 2 (two) times a day.   ? [START ON 12/10/2019] predniSONE (DELTASONE) 20 MG tablet Take 20 mg by mouth daily.   ? vitamin E 400 unit capsule Take 400 Units by mouth daily.         REVIEW OF SYSTEMS:    Currently, no fever, chills, or rigors. Does not have any visual or hearing problems. Denies any chest pain, headaches, palpitations, lightheadedness, dizziness, shortness of breath, or cough. Appetite is good. Denies any GERD symptoms. Denies any difficulty with swallowing, nausea, or vomiting.  Denies any abdominal pain, or constipation, but has loose stool. Denies any urinary symptoms. No insomnia. No active bleeding. No rash.       PHYSICAL EXAMINATION:  Vitals:    12/05/19 0453   BP: 139/84   Temp: (!) 96  F (35.6  C)   Weight: 174 lb 9.6 oz (79.2 kg)         GENERAL: Awake, Alert, oriented x3, not in any form of acute distress, answers questions appropriately, follows simple commands, conversant  HEENT: Head is normocephalic with normal hair distribution. No evidence of trauma. Ears: No acute purulent discharge. Eyes: Conjunctivae pink with no scleral jaundice. Nose: Normal mucosa and septum. NECK: Supple with no cervical or supraclavicular lymphadenopathy. Trachea is midline.   CHEST: No tenderness or deformity, no crepitus  LUNG: Clear to auscultation with good chest expansion. DM BS at bases, 02 4-6 liters  BACK: No kyphosis of the thoracic spine. Symmetric, no  curvature, ROM normal, no CVA tenderness, no spinal tenderness   CVS: There is good S1  S2, there are no murmurs, rhythm is regular.  ABDOMEN: Globular and soft, nontender to palpation, non distended, no masses, no organomegaly, good bowel sounds, no rebound or guarding, no peritoneal signs.   EXTREMITIES: Atraumatic. Full range of motion on both upper and lower extremities, there is no tenderness to palpation, no pedal edema, no cyanosis or clubbing, no calf tenderness, normal cap refill, no joint swelling.  SKIN: Warm and dry, no erythema noted, no rashes or lesions.  NEUROLOGICAL: The patient is oriented to person, place and time. Strength and sensation are grossly intact. Face is symmetric.                    LABS:    No results found for: WBC, HGB, HCT, MCV, PLT    No results found for this or any previous visit.        No results found for: HGBA1C  No results found for: [id number]  No results found for: [id number]    ASSESSMENT/PLAN:  1. Acute on chronic respiratory failure with hypoxia and hypercapnia (H)    2. Malignant neoplasm of upper lobe of right lung (H)    3. Type 2 diabetes mellitus with stage 3 chronic kidney disease, without long-term current use of insulin (H)        1/2> Respiratory failure and lung CA: Pt remains on 02 at 6 l nc, dysemic with exertion. In TCU for rehab services with PT/OT and SN services for chronic medical conditions.    3. DM: Refusing metformin. We will dc and start SS at this time, closely monitor his BS closely    Electronically signed by:  Fe Alvarado CNP  This progress note was completed using Dragon software and there may be grammatical errors.

## 2021-06-19 NOTE — LETTER
Letter by Daisy Plummer MBBS at      Author: Daisy Plummer MBBS Service: -- Author Type: --    Filed:  Encounter Date: 12/3/2019 Status: Signed         Patient: Jj Clements   MR Number: [id number]   YOB: 1945   Date of Visit: 12/3/2019       [facility] Admission note      Patient: Jj Clements  MRN: [id number]  Date of Service: 123/19      [facility] [[id number]]  Reason for Visit     Chief Complaint   Patient presents with   ? H & P       Code Status     DNR/DNI    Assessment     -Acute on chronic hypoxemic respiratory failure currently at 4 L supplemental oxygen at baseline currently discharged on 5 L of oxygen  -Squamous cell carcinoma of the right lung with lymphangitic spread  - HCAP   -End-stage COPD  -Chronic diastolic heart failure  -Pulmonary hypertension  -Diabetes type 2 insulin-dependent  -Obesity with a BMI between 30 and 39.9    Plan     Patient has been admitted to the TCU for strengthening and rehab.  He is found to have multiple complex comorbidities with end-stage COPD as well as lung cancer.  He also has chronic hypoxic respiratory failure with increasing shortness of breath currently discharged 5 L and pulmonary suspects that this may be his new baseline.  He has been given an empiric course of Augmentin for a 5-day regimen.  Also recommended a slow prednisone taper over 2 weeks.  Blood sugars to be monitored in the TCU they are elevated due to prednisone use.  Overall in light of his multiple comorbidities he is not felt to be a treatment candidate for squamous cell carcinoma of his lung.  Hospice care was recommended and patient did meet with hospice in the hospital but so far has deferred hospice.  Outpatient follow-up with pulmonology to discuss further treatment options recommended for him  He has been requested to start using nebulizers but with absolutely not use it as it makes him more hypoxic after treatments.  In addition Spiriva was  recommended to be changed to Respimat MDI  He was also requested to start a Trelegy inhaler    An additional 16 minutes were spent reviewing goals of care and CODE STATUS.  He is currently requesting DNR/DNI with selective treatments however he is not ready for hospice cares as yet  He is aware that he has end-stage COPD as well as lung cancer which is not being treated but would like to discuss further treatment options with his pulmonologist upon follow before making a decision  However family is currently looking at long-term care placement for him and also hospice cares.  Will await for care conference with     History     Patient is a very pleasant 74 y.o. male who is admitted to TCU  Patient presented with acute on chronic hypoxic respiratory failure.  He is on baseline 4 L supplemental oxygen at home.  He has end-stage COPD and sees pulmonary.  He was noted to have presumed COPD exacerbation in the hospital  He also has a recent diagnosis of squamous cell carcinoma of the right lung stage IIIa disease.  He was found to have a right upper lobe mass on CT done in August subsequently PET revealed a malignancy with suspected lymphangitic spread however as he is a marginal candidate he was not offered radiation treatment.  Hospice cares was recommended but patient has declined at present  Has a prior history of being a former smoker.  He is currently abstaining from smoking for the last few years  He was noted to have healthcare associated pneumonia and has been given IV antibiotics in the hospital currently given an empiric course of Augmentin for a 5-day treatment  Patient noted to have progressive shortness of breath especially worsened with exertion and it was felt the TCU would be beneficial to him he is been discharged here    Past Medical History       Past Medical History:   Diagnosis Date   ? Acute on chronic respiratory failure with hypoxia (H)    ? Anemia    ? BPH (benign prostatic  hyperplasia)    ? Bronchiectasis (H)    ? Chronic diastolic heart failure (H)    ? Chronic venous insufficiency    ? Closed fracture of acetabulum (H)    ? Closed fracture of surgical neck of right humerus    ? COPD with acute exacerbation (H)    ? COPD with emphysema (H)    ? DM2 (diabetes mellitus, type 2) (H)    ? Duodenal ulcer with hemorrhage    ? Gastrointestinal hemorrhage with melena    ? Hemorrhoids    ? Hernia of abdominal cavity    ? Hyperkalemia    ? Hyponatremia    ? Obesity    ? Pulmonary HTN (H)    ? Pulmonary nodule    ? Scrotal skin lesion    ? SOB (shortness of breath)    ? Squamous cell carcinoma lung (H)        Past Social History     Reviewed, and he  reports that he has quit smoking. He has a 45.00 pack-year smoking history. He has never used smokeless tobacco. He reports current alcohol use.    Family History     Reviewed, and includes MI in his father who  of heart disease in his 70s Sister had breast cancer    Medication List   Post Discharge Medication Reconciliation Status: discharge medications reconciled, continue medications without change   acetaminophen (TYLENOL) 500 MG tablet   Sig: Take 500 mg by mouth 3 (three) times a day.   Class: Historical Med   Route: Oral   albuterol (PROVENTIL) 2.5 mg /3 mL (0.083 %) nebulizer solution   Sig: Take 2.5 mg by nebulization every 6 (six) hours as needed for wheezing.   Class: Historical Med   Route: Nebulization   amoxicillin-clavulanate (AUGMENTIN) 875-125 mg per tablet   Sig: Take 1 tablet by mouth 2 (two) times a day.   Class: Historical Med   Route: Oral   carvedilol (COREG) 6.25 MG tablet   Sig: Take 6.25 mg by mouth 2 (two) times a day with meals.   Class: Historical Med   Route: Oral   lisinopril (PRINIVIL,ZESTRIL) 2.5 MG tablet   Sig: Take 2.5 mg by mouth daily.   Class: Historical Med   Route: Oral   metFORMIN (GLUCOPHAGE) 1000 MG tablet   Sig: Take 1,000 mg by mouth 2 (two) times a day with meals.   Class: Historical Med   Route:  Oral   multivitamin (MULTIPLE VITAMIN ORAL)   Sig: Take 1 tablet by mouth daily.   Class: Historical Med   Route: Oral   predniSONE (DELTASONE) 20 MG tablet   Sig: Take 20 mg by mouth 2 (two) times a day.   Class: Historical Med   Route: Oral   predniSONE (DELTASONE) 20 MG tablet   Starting on: 12/10/2019   Sig: Take 20 mg by mouth daily.   Class: Historical Med   Route: Oral   fluticasone-umeclidinium-vilanterol (TRELEGY ELLIPTA) 100-62.5-25 mcg DsDv inhaler   Sig: Inhale 1 Inhalation daily.   Class: Historical Med   Route: Inhalation   ascorbic acid, vitamin C, (ASCORBIC ACID WITH CLEMENT HIPS) 500 MG tablet   Sig: Take 500 mg by mouth daily.   Class: Historical Med   Route: Oral   vitamin E 400 unit capsule   Sig: Take 400 Units by mouth daily.   Class: Historical Med   Route: Oral         Allergies     Allergies   Allergen Reactions   ? Aspirin      Other reaction(s): GI Bleeding  contraindication       Review of Systems   A comprehensive review of 14 systems was done. Pertinent findings noted here and in history of present illness. All the rest negative.  Constitutional: Negative.  Negative for fever, chills, he has profound activity change, appetite change and fatigue.   HENT: Negative for congestion and facial swelling.    Eyes: Negative for photophobia, redness and visual disturbance.   Respiratory: Negative for cough and chest tightness.  Quite anxious about his oxygen saturations he is refusing movement as he told me that it makes his oxygen saturation dropped and he did sit up he was checking his pulse oximetry  Cardiovascular: Negative for chest pain, palpitations and leg swelling.   Gastrointestinal: Negative for nausea, diarrhea, constipation, blood in stool and abdominal distention.   Genitourinary: Negative.    Musculoskeletal: Negative.  Weak with limited endurance laying in bed refusing to get up as it drops his oxygen saturation  Skin: Negative.    Neurological: Negative for dizziness, tremors,  syncope, weakness, light-headedness and headaches.   Hematological: Does not bruise/bleed easily.   Psychiatric/Behavioral: Negative.  Irritable and affect      Physical Exam     Weight is 180 pounds blood pressure 133/78 temp 98 pulse 89    Constitutional: Oriented to person, place, and time and appears well-developed.   Disabled in appearance  Patient appears to be in moderate respiratory distress he is ranging from 6 to 9 L of oxygen by nasal cannula in the TCU.  Noted to be pursing his mouth to breathe  HEENT:  Normocephalic and atraumatic.  Eyes: Conjunctivae and EOM are normal. Pupils are equal, round, and reactive to light. No discharge.  No scleral icterus. Nose normal. Mouth/Throat: Oropharynx is clear and moist. No oropharyngeal exudate.    NECK: Normal range of motion. Neck supple. No JVD present. No tracheal deviation present. No thyromegaly present.   CARDIOVASCULAR: Normal rate, regular rhythm and intact distal pulses.  Exam reveals no gallop and no friction rub.  Systolic murmur present.  PULMONARY: Effort normal and breath sounds normal. No respiratory distress.No Wheezing or rales.  Diffuse scattered wheezing expiratory especially in the Rt lung noted  ABDOMEN: Soft. Bowel sounds are normal. No distension and no mass.  There is no tenderness. There is no rebound and no guarding. No HSM.  MUSCULOSKELETAL: Normal range of motion. No edema and no tenderness. Mild kyphosis, no tenderness.  LYMPH NODES: Has no cervical, supraclavicular, axillary and groin adenopathy.   NEUROLOGICAL: Alert and oriented to person, place, and time. No cranial nerve deficit.  Normal muscle tone. Coordination normal.   GENITOURINARY: Deferred exam.  SKIN: Skin is warm and dry. No rash noted. No erythema. No pallor.   EXTREMITIES: No cyanosis, no clubbing, no edema. No Deformity.  PSYCHIATRIC: Normal mood, affect and behavior.  Affect is irritable      Lab Results     X-ray chest 11/29/2019 shows right upper lobe mass with  significant increase in size interstitial prominence around the tumor concerning for lymphangitic spread versus airspace disease  Bibasilar effusion  Last BMP shows a sodium of 1 2 potassium 5.2 with a chloride of 85 BUN 12 and creatinine of 0.8        GARIMA Butt

## 2021-06-20 NOTE — LETTER
"Letter by Fe Alvarado CNP at      Author: Fe Alvarado CNP Service: -- Author Type: --    Filed:  Encounter Date: 2019 Status: Signed         Patient: Jj Clements   MR Number: [id number]   YOB: 1945   Date of Visit: 2019     [facility]      NAME:  Jj Clements             :  1945  MRN: [id number]  CODE STATUS:  DNR    VISIT TYPE: DISCHARGE SUMMARY  [facility]ACILYTY: [facility] [[id number]]       HOSPITALIZATION:      [facility]          PRIMARY CARE PROVIDER: Kirit Miranda MD    DISCHARGE DIAGNOSIS:      1. Acute on chronic respiratory failure with hypoxia and hypercapnia (H)    2. Chronic diastolic heart failure (H)    3. Malignant neoplasm of upper lobe of right lung (H)         DISCHARGE MEDICATIONS:         Medication List          Accurate as of 2019 11:59 PM. If you have any questions, ask your nurse or doctor.            CONTINUE taking these medications    acetaminophen 500 MG tablet  Commonly known as:  TYLENOL     albuterol 2.5 mg /3 mL (0.083 %) nebulizer solution  Commonly known as:  PROVENTIL     ascorbic acid with sierra hips 500 MG tablet  Generic drug:  ascorbic acid (vitamin C)     carvedilol 6.25 MG tablet  Commonly known as:  COREG     insulin glargine 100 unit/mL vial  Commonly known as:  LANTUS     lisinopril 2.5 MG tablet  Commonly known as:  PRINIVIL,ZESTRIL     MULTIPLE VITAMIN ORAL     Trelegy Ellipta 100-62.5-25 mcg Dsdv inhaler  Generic drug:  fluticasone-umeclidinium-vilanterol     vitamin E 400 unit capsule            HISTORY OF PRESENT ILLNESS: Jj Clements is a 74 y.o. male is being seen today for a face to face visit for an anticipated am dc , home to an AL with hospice services. Family has been supportive.He was recently at [facility]. 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 He is finishing a course of Augmentin. He has been refusing his metformin, he feels it is causing his loose stools, reports had at home in past. May also be Augmentin but pt insistent not taking his metformin due to loose stools, reviewed this again with pt. BS as high as 371.  He will have SS dc but going home on Lantus 8 units daily. BS have been trending down, was 296 this am. He will have 02 at 6l nc and hospice providing support as he is dced.         PHYSICAL EXAMINATION:    Vitals:    12/20/19 0835   BP: 105/67   Pulse: 97   Temp: 98  F (36.7  C)   Weight: 173 lb 3.2 oz (78.6 kg)         GENERAL: Awake, Alert, oriented x3, not in any form of acute distress, answers questions appropriately, follows simple commands, conversant  HEENT: Head is normocephalic with normal hair distribution. No evidence of trauma. Ears: No acute purulent discharge. Eyes: Conjunctivae pink with no scleral jaundice. Nose: Normal mucosa and septum. NECK: Supple with no cervical or supraclavicular lymphadenopathy. Trachea is midline.   CHEST: No tenderness or deformity, no crepitus  LUNG: Clear to auscultation with good chest expansion. DM BS at bases, 02 4-6 liters  BACK: No kyphosis of the thoracic spine. Symmetric, no curvature, ROM normal, no CVA tenderness, no spinal tenderness   CVS: There is good S1  S2, there are no murmurs, rhythm is regular.  ABDOMEN: Globular and soft, nontender to palpation, non distended, no masses, no organomegaly, good bowel sounds, no rebound or guarding, no peritoneal signs.   EXTREMITIES: Atraumatic. Full range of motion on both upper and lower extremities, there is no tenderness to palpation, has pedal edema, " aged arthritic  joint swelling.  SKIN: Warm and dry, no erythema noted, no rashes or lesions.  NEUROLOGICAL: The patient is oriented to person, place and time. Strength and sensation are grossly intact. Face is symmetric.              LABS:  All labs reviewed in the nursing home record.        DISCHARGE PLAN: DC home with Hospice services, needs 02 at [facility], they have delivered to facility. Nursing to provide family on education with meds on dc including insulin, was not on insulin prior to dc.     Patient will follow up with PCP within 7- days after discharge for medication mangagment and appropriate lab studies.      Post Discharge Medication Reconciliation Status: discharge medications reconciled and changed, per note/orders (see AVS)    Electronically signed by:  Fe Alvarado CNP  This progress note was completed using Dragon software and there may be grammatical errors.      For documentation purposes, chart review, medication management, and discharge coordination of care was greater than 35 minutes

## 2021-06-20 NOTE — LETTER
Letter by Daisy Plummer MBBS at      Author: Daisy Plummer MBBS Service: -- Author Type: --    Filed:  Encounter Date: 12/10/2019 Status: Signed         Patient: Jj Clements   MR Number: [id number]   YOB: 1945   Date of Visit: 12/10/2019       [facility] Admission note      Patient: Jj Clements  MRN: [id number]  Date of Service: 123/19      [facility] [[id number]]  Reason for Visit     Chief Complaint   Patient presents with   ? 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  He is found to have multiple complex comorbidities with end-stage COPD as well as lung cancer.  Unfortunately remains profoundly debilitated  He is currently done with his antibiotic but remains on a slow prednisone taper.  He requires anywhere from 6 to 8 L of oxygen due to persistent hypoxia.  Baseline functional capacity is limited and he gets deconditioned and short of breath very easily.  He has had persistent weight loss he has been started on supplementation with Glucerna due to which he has been started on Glucerna  Diabetes control is very poor with persistent high blood sugars noted.  He has refused to take metformin  We will empirically start him on Lantus 5 units in the morning in addition to sliding scale coverage to assess control he  is not a candidate for tight control but would like to bring all his blood sugars under 200  History     Patient is a very pleasant 74 y.o. male who is admitted to TCU  Patient presented with acute on chronic hypoxic respiratory failure.  He is on baseline 4 L supplemental oxygen at home.  He has end-stage COPD and sees pulmonary.  He was noted to have presumed COPD exacerbation in the hospital  He also has a recent diagnosis of squamous cell carcinoma of the right lung stage IIIa disease.  He was found to have a right upper lobe mass on CT done in August subsequently PET revealed a malignancy with suspected lymphangitic spread however as he is a marginal candidate he was not offered radiation treatment.  Hospice cares was recommended but patient has declined at present  He is not aware when he is supposed to follow with his primary pulmonologist  Has a prior history of being a former smoker.  He is currently abstaining from smoking for the last few years  He was noted to have healthcare associated pneumonia and has been given IV antibiotics in the hospital currently given an empiric course of Augmentin for a 5-day treatment currently he is done with his antibiotics  Patient noted to have progressive shortness of breath especially worsened with exertion and it was felt the TCU would be beneficial to him he is been discharged here  No improvement noted and hypoxia he needs anywhere from 6 to 8 L of oxygen now and remains bedbound  He has had progressive weight loss since admission even though his appetite has been good  Diabetic control is very poor and he is refusing to take his metformin due to diarrhea concerns and stomach upset concerns    Past Medical History       Past Medical History:   Diagnosis Date   ? Acute on chronic respiratory failure with hypoxia (H)    ? Anemia    ? BPH (benign prostatic hyperplasia)    ? Bronchiectasis (H)    ? Chronic diastolic heart failure (H)    ? Chronic venous insufficiency     ? Closed fracture of acetabulum (H)    ? Closed fracture of surgical neck of right humerus    ? COPD with acute exacerbation (H)    ? COPD with emphysema (H)    ? DM2 (diabetes mellitus, type 2) (H)    ? Duodenal ulcer with hemorrhage    ? Gastrointestinal hemorrhage with melena    ? Hemorrhoids    ? Hernia of abdominal cavity    ? Hyperkalemia    ? Hyponatremia    ? Obesity    ? Pulmonary HTN (H)    ? Pulmonary nodule    ? Scrotal skin lesion    ? SOB (shortness of breath)    ? Squamous cell carcinoma lung (H)        Past Social History     Reviewed, and he  reports that he has quit smoking. He has a 45.00 pack-year smoking history. He has never used smokeless tobacco. He reports current alcohol use.    Family History     Reviewed, and includes MI in his father who  of heart disease in his 70s Sister had breast cancer    Medication List   Post Discharge Medication Reconciliation Status: discharge medications reconciled, continue medications without change   acetaminophen (TYLENOL) 500 MG tablet   Sig: Take 500 mg by mouth 3 (three) times a day.   Class: Historical Med   Route: Oral   albuterol (PROVENTIL) 2.5 mg /3 mL (0.083 %) nebulizer solution   Sig: Take 2.5 mg by nebulization every 6 (six) hours as needed for wheezing.   Class: Historical Med   Route: Nebulization   amoxicillin-clavulanate (AUGMENTIN) 875-125 mg per tablet   Sig: Take 1 tablet by mouth 2 (two) times a day.   Class: Historical Med   Route: Oral   carvedilol (COREG) 6.25 MG tablet   Sig: Take 6.25 mg by mouth 2 (two) times a day with meals.   Class: Historical Med   Route: Oral   lisinopril (PRINIVIL,ZESTRIL) 2.5 MG tablet   Sig: Take 2.5 mg by mouth daily.   Class: Historical Med   Route: Oral   metFORMIN (GLUCOPHAGE) 1000 MG tablet   Sig: Take 1,000 mg by mouth 2 (two) times a day with meals.   Class: Historical Med   Route: Oral   multivitamin (MULTIPLE VITAMIN ORAL)   Sig: Take 1 tablet by mouth daily.   Class: Historical Med   Route:  Oral   predniSONE (DELTASONE) 20 MG tablet   Sig: Take 20 mg by mouth 2 (two) times a day.   Class: Historical Med   Route: Oral   predniSONE (DELTASONE) 20 MG tablet   Starting on: 12/10/2019   Sig: Take 20 mg by mouth daily.   Class: Historical Med   Route: Oral   fluticasone-umeclidinium-vilanterol (TRELEGY ELLIPTA) 100-62.5-25 mcg DsDv inhaler   Sig: Inhale 1 Inhalation daily.   Class: Historical Med   Route: Inhalation   ascorbic acid, vitamin C, (ASCORBIC ACID WITH CLEMENT HIPS) 500 MG tablet   Sig: Take 500 mg by mouth daily.   Class: Historical Med   Route: Oral   vitamin E 400 unit capsule   Sig: Take 400 Units by mouth daily.   Class: Historical Med   Route: Oral         Allergies     Allergies   Allergen Reactions   ? Aspirin      Other reaction(s): GI Bleeding  contraindication       Review of Systems   A comprehensive review of 14 systems was done. Pertinent findings noted here and in history of present illness. All the rest negative.  Constitutional: Negative.  Negative for fever, chills, he has profound activity change, appetite change and fatigue.   HENT: Negative for congestion and facial swelling.    Eyes: Negative for photophobia, redness and visual disturbance.   Respiratory: Negative for cough and chest tightness.  Quite anxious about his oxygen saturations he is refusing movement as he told me that it makes his oxygen saturation dropped and he did sit up he was checking his pulse oximetry  Cardiovascular: Negative for chest pain, palpitations and leg swelling.   Gastrointestinal: Negative for nausea, diarrhea, constipation, blood in stool and abdominal distention.   Genitourinary: Negative.    Musculoskeletal: Negative.  Weak with limited endurance laying in bed refusing to get up as it drops his oxygen saturation  Skin: Negative.    Neurological: Negative for dizziness, tremors, syncope, weakness, light-headedness and headaches.   Hematological: Does not bruise/bleed easily.    Psychiatric/Behavioral: Negative.  Irritable and affect      Physical Exam     Weight is 173 pounds blood pressure 104/ 56  temp 98 pulse 89    Constitutional: Oriented to person, place, and time and appears well-developed.   Disabled in appearance  Patient appears to be in moderate respiratory distress he is ranging from 6 to 9 L of oxygen by nasal cannula in the TCU.  Noted to be pursing his mouth to breathe  HEENT:  Normocephalic and atraumatic.  Eyes: Conjunctivae and EOM are normal. Pupils are equal, round, and reactive to light. No discharge.  No scleral icterus. Nose normal. Mouth/Throat: Oropharynx is clear and moist. No oropharyngeal exudate.    NECK: Normal range of motion. Neck supple. No JVD present. No tracheal deviation present. No thyromegaly present.   CARDIOVASCULAR: Normal rate, regular rhythm and intact distal pulses.  Exam reveals no gallop and no friction rub.  Systolic murmur present.  PULMONARY: Effort normal and breath sounds normal. No respiratory distress.No Wheezing or rales.  Diffuse scattered wheezing expiratory especially in the Rt lung noted  ABDOMEN: Soft. Bowel sounds are normal. No distension and no mass.  There is no tenderness. There is no rebound and no guarding. No HSM.  MUSCULOSKELETAL: Normal range of motion. No edema and no tenderness. Mild kyphosis, no tenderness.  LYMPH NODES: Has no cervical, supraclavicular, axillary and groin adenopathy.   NEUROLOGICAL: Alert and oriented to person, place, and time. No cranial nerve deficit.  Normal muscle tone. Coordination normal.   GENITOURINARY: Deferred exam.  SKIN: Skin is warm and dry. No rash noted. No erythema. No pallor.   EXTREMITIES: No cyanosis, no clubbing, no edema. No Deformity.  PSYCHIATRIC: Normal mood, affect and behavior.  Affect is irritable      Lab Results     X-ray chest 11/29/2019 shows right upper lobe mass with significant increase in size interstitial prominence around the tumor concerning for lymphangitic  spread versus airspace disease  Bibasilar effusion  Last BMP shows a sodium of 1 2 potassium 5.2 with a chloride of 85 BUN 12 and creatinine of 0.8        GARIMA Butt

## 2021-06-20 NOTE — LETTER
Letter by Daisy Plummer MBBS at      Author: Daisy Plummer MBBS Service: -- Author Type: --    Filed:  Encounter Date: 12/17/2019 Status: Signed         Patient: Jj Clements   MR Number: [id number]   YOB: 1945   Date of Visit: 12/17/2019       [facility] Admission note      Patient: Jj Clements  MRN: [id number]  Date of Service: 123/19      [facility] [[id number]]  Reason for Visit     Chief Complaint   Patient presents with   ? 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  He is found to have multiple complex comorbidities with end-stage COPD as well as lung cancer.  Unfortunately remains profoundly debilitated  He is currently bedbound and poorly ambulatory.  Unfortunately extreme fatigue and limited endurance is limited his ability to progress in therapy he states that even minimal movement can cause him extreme degree of shortness of breath and drop in oxygen saturation.  Blood sugars continue to be uncontrolled with blood sugars ranging from 200 to as high as 400 noted in the TCU.  He has refused to take metformin.  He is not a candidate for tight control.  He wanted to review his inhalers and we did look at his Trelegy inhaler as he is quite upset that history was discontinued and we is was reassured that this  was not.  I did show him the label on his Trelegy inhaler.  Discharge planning reviewed with him and he is hoping to discharge to assisted living facility soon we are recommending hospice cares for him    History     Patient is a very pleasant 74 y.o. male who is admitted to TCU  Patient presented with acute on chronic hypoxic respiratory failure.  He is on baseline 4 L supplemental oxygen at home.  Unfortunately has progressive respiratory failure and now on anywhere from 6 to 8 L of oxygen patient is self-monitoring his oxygen saturations  He has end-stage COPD and sees pulmonary.  He was noted to have presumed COPD exacerbation in the hospital  He also has a recent diagnosis of squamous cell carcinoma of the right lung stage IIIa disease.  He was found to have a right upper lobe mass on CT done in August subsequently PET revealed a malignancy with suspected lymphangitic spread however as he is a marginal candidate he was not offered radiation treatment.  Hospice cares was recommended but patient has declined at present  He is not aware when he is supposed to follow with his primary pulmonologist  Has a prior history of being a former smoker.  He is currently abstaining from smoking for the last few years quite upset that his very well was discontinued we did review his the hospital discharge orders and the fact that he is on Trelegy  He was noted to have healthcare associated pneumonia and has been given IV antibiotics in the hospital currently given an empiric course of Augmentin for a 5-day treatment currently he is done with his antibiotics  Patient noted to have progressive weight loss.  He reports that his oral intake is good.  In addition he has diabetes with blood sugars which are ranging from 200-4 00+ he is on insulin unfortunately is being poorly controlled at present.  He told me he does not want to take his metformin at all.  He is looking at an assisted living discharge planning    Past Medical  History       Past Medical History:   Diagnosis Date   ? Acute on chronic respiratory failure with hypoxia (H)    ? Anemia    ? BPH (benign prostatic hyperplasia)    ? Bronchiectasis (H)    ? Chronic diastolic heart failure (H)    ? Chronic venous insufficiency    ? Closed fracture of acetabulum (H)    ? Closed fracture of surgical neck of right humerus    ? COPD with acute exacerbation (H)    ? COPD with emphysema (H)    ? DM2 (diabetes mellitus, type 2) (H)    ? Duodenal ulcer with hemorrhage    ? Gastrointestinal hemorrhage with melena    ? Hemorrhoids    ? Hernia of abdominal cavity    ? Hyperkalemia    ? Hyponatremia    ? Obesity    ? Pulmonary HTN (H)    ? Pulmonary nodule    ? Scrotal skin lesion    ? SOB (shortness of breath)    ? Squamous cell carcinoma lung (H)        Past Social History     Reviewed, and he  reports that he has quit smoking. He has a 45.00 pack-year smoking history. He has never used smokeless tobacco. He reports current alcohol use.    Family History     Reviewed, and includes MI in his father who  of heart disease in his 70s Sister had breast cancer    Medication List   Post Discharge Medication Reconciliation Status: discharge medications reconciled, continue medications without change   acetaminophen (TYLENOL) 500 MG tablet   Sig: Take 500 mg by mouth 3 (three) times a day.   Class: Historical Med   Route: Oral   albuterol (PROVENTIL) 2.5 mg /3 mL (0.083 %) nebulizer solution   Sig: Take 2.5 mg by nebulization every 6 (six) hours as needed for wheezing.   Class: Historical Med   Route: Nebulization   amoxicillin-clavulanate (AUGMENTIN) 875-125 mg per tablet   Sig: Take 1 tablet by mouth 2 (two) times a day.   Class: Historical Med   Route: Oral   carvedilol (COREG) 6.25 MG tablet   Sig: Take 6.25 mg by mouth 2 (two) times a day with meals.   Class: Historical Med   Route: Oral   lisinopril (PRINIVIL,ZESTRIL) 2.5 MG tablet   Sig: Take 2.5 mg by mouth daily.   Class: Historical Med    Route: Oral   metFORMIN (GLUCOPHAGE) 1000 MG tablet   Sig: Take 1,000 mg by mouth 2 (two) times a day with meals.   Class: Historical Med   Route: Oral   multivitamin (MULTIPLE VITAMIN ORAL)   Sig: Take 1 tablet by mouth daily.   Class: Historical Med   Route: Oral   predniSONE (DELTASONE) 20 MG tablet   Sig: Take 20 mg by mouth 2 (two) times a day.   Class: Historical Med   Route: Oral   predniSONE (DELTASONE) 20 MG tablet   Starting on: 12/10/2019   Sig: Take 20 mg by mouth daily.   Class: Historical Med   Route: Oral   fluticasone-umeclidinium-vilanterol (TRELEGY ELLIPTA) 100-62.5-25 mcg DsDv inhaler   Sig: Inhale 1 Inhalation daily.   Class: Historical Med   Route: Inhalation   ascorbic acid, vitamin C, (ASCORBIC ACID WITH CLEMENT HIPS) 500 MG tablet   Sig: Take 500 mg by mouth daily.   Class: Historical Med   Route: Oral   vitamin E 400 unit capsule   Sig: Take 400 Units by mouth daily.   Class: Historical Med   Route: Oral         Allergies     Allergies   Allergen Reactions   ? Aspirin      Other reaction(s): GI Bleeding  contraindication       Review of Systems   A comprehensive review of 14 systems was done. Pertinent findings noted here and in history of present illness. All the rest negative.  Constitutional: Negative.  Negative for fever, chills, he has profound activity change, appetite change and fatigue.   HENT: Negative for congestion and facial swelling.    Eyes: Negative for photophobia, redness and visual disturbance.   Respiratory: Negative for cough and chest tightness.  Quite anxious about his oxygen saturations he is refusing movement as he told me that it makes his oxygen saturation dropped and he did sit up he was checking his pulse oximetry  Cardiovascular: Negative for chest pain, palpitations and leg swelling.   Gastrointestinal: Negative for nausea, diarrhea, constipation, blood in stool and abdominal distention.   Genitourinary: Negative.    Musculoskeletal: Negative.  Weak with limited  endurance laying in bed refusing to get up as it drops his oxygen saturation  Skin: Negative.    Neurological: Negative for dizziness, tremors, syncope, weakness, light-headedness and headaches.   Hematological: Does not bruise/bleed easily.   Psychiatric/Behavioral: Negative.  Irritable and affect      Physical Exam     Weight is 173 pounds blood pressure 99 / 54  temp 98 pulse 89    Constitutional: Oriented to person, place, and time and appears well-developed.   Disabled in appearance  Patient appears to be in moderate respiratory distress he is ranging from 6 to 9 L of oxygen by nasal cannula in the TCU.  Noted to be pursing his mouth to breathe  HEENT:  Normocephalic and atraumatic.  Eyes: Conjunctivae and EOM are normal. Pupils are equal, round, and reactive to light. No discharge.  No scleral icterus. Nose normal. Mouth/Throat: Oropharynx is clear and moist. No oropharyngeal exudate.    NECK: Normal range of motion. Neck supple. No JVD present. No tracheal deviation present. No thyromegaly present.   CARDIOVASCULAR: Normal rate, regular rhythm and intact distal pulses.  Exam reveals no gallop and no friction rub.  Systolic murmur present.  PULMONARY: Effort normal and breath sounds normal. No respiratory distress.No Wheezing or rales.  Diffuse scattered wheezing expiratory especially in the Rt lung noted  ABDOMEN: Soft. Bowel sounds are normal. No distension and no mass.  There is no tenderness. There is no rebound and no guarding. No HSM.  MUSCULOSKELETAL: Normal range of motion. No edema and no tenderness. Mild kyphosis, no tenderness.  LYMPH NODES: Has no cervical, supraclavicular, axillary and groin adenopathy.   NEUROLOGICAL: Alert and oriented to person, place, and time. No cranial nerve deficit.  Normal muscle tone. Coordination normal.   GENITOURINARY: Deferred exam.  SKIN: Skin is warm and dry. No rash noted. No erythema. No pallor.   EXTREMITIES: No cyanosis, no clubbing, no edema. No  Deformity.  PSYCHIATRIC: Normal mood, affect and behavior.  Affect is irritable      Lab Results     X-ray chest 11/29/2019 shows right upper lobe mass with significant increase in size interstitial prominence around the tumor concerning for lymphangitic spread versus airspace disease  Bibasilar effusion  Last BMP shows a sodium of 1 2 potassium 5.2 with a chloride of 85 BUN 12 and creatinine of 0.8        GARIMA Butt

## 2021-06-20 NOTE — LETTER
"Letter by Fe Alvarado CNP at      Author: Fe Alvarado CNP Service: -- Author Type: --    Filed:  Encounter Date: 2019 Status: Signed         Patient: Jj Clements   MR Number: [id number]   YOB: 1945   Date of Visit: 2019       [facility]      NAME:  Jj Clements             :  1945    MRN: [id number]    CODE STATUS:  DNR    FACILITY: [facility] [[id number]]      CHIEF COMPLAIN/REASON FOR VISIT:  Chief Complaint   Patient presents with   ? Review Of Multiple Medical Conditions       HISTORY OF PRESENT ILLNESS: Jj Clements is a 74 y.o. male being seen today for review of multiple medical conditions. He was recently at [facility]. 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 He is finishing a course of Augmentin. He has been refusing his metformin, he feels it is causing his loose stools, reports had at home in past. May also be Augmentin but pt insistent not taking his metformin due to loose stools, reviewed this again with pt. BS as high as 371. .Reviewed BS,he is on Lantus now at 5 units, we will adjust. He reports he hopes to have dc by . Will discuss with SW as placement he will require insulin help.    Allergies   Allergen Reactions   ? Aspirin      Other reaction(s): GI Bleeding  contraindication   :     Current Outpatient Medications   Medication Sig   ? insulin glargine (LANTUS) 100 unit/mL vial Inject 8 Units under the skin at bedtime.   ? acetaminophen (TYLENOL) 500 MG tablet Take 500 mg by mouth 3 (three) times a day.   ? " albuterol (PROVENTIL) 2.5 mg /3 mL (0.083 %) nebulizer solution Take 2.5 mg by nebulization every 6 (six) hours as needed for wheezing.   ? ascorbic acid, vitamin C, (ASCORBIC ACID WITH CLEMENT HIPS) 500 MG tablet Take 500 mg by mouth daily.   ? carvedilol (COREG) 6.25 MG tablet Take 6.25 mg by mouth 2 (two) times a day with meals.   ? fluticasone-umeclidinium-vilanterol (TRELEGY ELLIPTA) 100-62.5-25 mcg DsDv inhaler Inhale 1 Inhalation daily.   ? lisinopril (PRINIVIL,ZESTRIL) 2.5 MG tablet Take 2.5 mg by mouth daily.   ? multivitamin (MULTIPLE VITAMIN ORAL) Take 1 tablet by mouth daily.   ? predniSONE (DELTASONE) 20 MG tablet Take 20 mg by mouth daily.   ? vitamin E 400 unit capsule Take 400 Units by mouth daily.         REVIEW OF SYSTEMS:    Currently, no fever, chills, or rigors. Does not have any visual or hearing problems. Denies any chest pain, headaches, palpitations, lightheadedness, dizziness, shortness of breath, or cough. Appetite is good. Denies any GERD symptoms. Denies any difficulty with swallowing, nausea, or vomiting.  Denies any abdominal pain, or constipation, but has loose stool. Denies any urinary symptoms. No insomnia. No active bleeding. No rash.       PHYSICAL EXAMINATION:  Vitals:    12/12/19 2043   BP: 111/72   Pulse: (!) 50   Temp: 98.7  F (37.1  C)   Weight: 172 lb 6.4 oz (78.2 kg)         GENERAL: Awake, Alert, oriented x3, not in any form of acute distress, answers questions appropriately, follows simple commands, conversant  HEENT: Head is normocephalic with normal hair distribution. No evidence of trauma. Ears: No acute purulent discharge. Eyes: Conjunctivae pink with no scleral jaundice. Nose: Normal mucosa and septum. NECK: Supple with no cervical or supraclavicular lymphadenopathy. Trachea is midline.   CHEST: No tenderness or deformity, no crepitus  LUNG: Clear to auscultation with good chest expansion. DM BS at bases, 02 4-6 liters  BACK: No kyphosis of the thoracic spine.  Symmetric, no curvature, ROM normal, no CVA tenderness, no spinal tenderness   CVS: There is good S1  S2, there are no murmurs, rhythm is regular.  ABDOMEN: Globular and soft, nontender to palpation, non distended, no masses, no organomegaly, good bowel sounds, no rebound or guarding, no peritoneal signs.   EXTREMITIES: Atraumatic. Full range of motion on both upper and lower extremities, there is no tenderness to palpation, no pedal edema, no cyanosis or clubbing, no calf tenderness, normal cap refill, no joint swelling.  SKIN: Warm and dry, no erythema noted, no rashes or lesions.  NEUROLOGICAL: The patient is oriented to person, place and time. Strength and sensation are grossly intact. Face is symmetric.                    LABS:    No results found for: WBC, HGB, HCT, MCV, PLT    No results found for this or any previous visit.        No results found for: HGBA1C  No results found for: [id number]  No results found for: [id number]    ASSESSMENT/PLAN:  1. Malignant neoplasm of upper lobe of right lung (H)    2. Acute on chronic respiratory failure with hypoxia and hypercapnia (H)    3. Type 2 diabetes mellitus with stage 3 chronic kidney disease, with long-term current use of insulin (H)        1/2 Respiratory failure and lung CA: Pt remains on 02 at 6 l nc, dysemic with exertion. In TCU for rehab services with PT/OT and SN services for chronic medical conditions.    3. DM: Still refusing metformin. We did dc and start SS at this time, lino OLIVEIRA had added Lantus 5 units Q am, sugars are between 171-367, we will increase Lantus up to 8 units. SN to continue to monitor.    Electronically signed by:  Fe Alvarado CNP  This progress note was completed using Dragon software and there may be grammatical errors.

## 2021-06-28 NOTE — PROGRESS NOTES
"Progress Notes by Fe Alvarado CNP at 2019  9:08 AM     Author: Fe Alvarado CNP Service: -- Author Type: Nurse Practitioner    Filed: 2019  8:53 AM Encounter Date: 2019 Status: Signed    : Fe Alvarado CNP (Nurse Practitioner)       [facility]      NAME:  Jj Clements             :  1945  MRN: [id number]  CODE STATUS:  DNR    VISIT TYPE: DISCHARGE SUMMARY  FACILYTY: [facility] [[id number]]       HOSPITALIZATION:      [facility]          PRIMARY CARE PROVIDER: Kirit Miranda MD    DISCHARGE DIAGNOSIS:      1. Acute on chronic respiratory failure with hypoxia and hypercapnia (H)    2. Chronic diastolic heart failure (H)    3. Malignant neoplasm of upper lobe of right lung (H)         DISCHARGE MEDICATIONS:         Medication List          Accurate as of 2019 11:59 PM. If you have any questions, ask your nurse or doctor.            CONTINUE taking these medications    acetaminophen 500 MG tablet  Commonly known as:  TYLENOL     albuterol 2.5 mg /3 mL (0.083 %) nebulizer solution  Commonly known as:  PROVENTIL     ascorbic acid with sierra hips 500 MG tablet  Generic drug:  ascorbic acid (vitamin C)     carvedilol 6.25 MG tablet  Commonly known as:  COREG     insulin glargine 100 unit/mL vial  Commonly known as:  LANTUS     lisinopril 2.5 MG tablet  Commonly known as:  PRINIVIL,ZESTRIL     MULTIPLE VITAMIN ORAL     Trelegy Ellipta 100-62.5-25 mcg Dsdv inhaler  Generic drug:  fluticasone-umeclidinium-vilanterol     vitamin E 400 unit capsule            HISTORY OF PRESENT ILLNESS: Jj Clements is a 74 y.o. male is being seen today for a face to face visit for an anticipated am dc , home to an AL with hospice services. Family has been supportive.He was recently at [facility]. 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 He is finishing a course of Augmentin. He has been refusing his metformin, he feels it is causing his loose stools, reports had at home in past. May also be Augmentin but pt insistent not taking his metformin due to loose stools, reviewed this again with pt. BS as high as 371. He will have SS dc but going home on Lantus 8 units daily. BS have been trending down, was 296 this am. He will have 02 at 6l nc and hospice providing support as he is dced.         PHYSICAL EXAMINATION:    Vitals:    12/20/19 0835   BP: 105/67   Pulse: 97   Temp: 98  F (36.7  C)   Weight: 173 lb 3.2 oz (78.6 kg)         GENERAL: Awake, Alert, oriented x3, not in any form of acute distress, answers questions appropriately, follows simple commands, conversant  HEENT: Head is normocephalic with normal hair distribution. No evidence of trauma. Ears: No acute purulent discharge. Eyes: Conjunctivae pink with no scleral jaundice. Nose: Normal mucosa and septum. NECK: Supple with no cervical or supraclavicular lymphadenopathy. Trachea is midline.   CHEST: No tenderness or deformity, no crepitus  LUNG: Clear to auscultation with good chest expansion. DM BS at bases, 02 4-6 liters  BACK: No kyphosis of the thoracic spine. Symmetric, no curvature, ROM normal, no CVA tenderness, no spinal tenderness   CVS: There is good S1  S2, there are no murmurs, rhythm is regular.  ABDOMEN: Globular and soft, nontender to palpation, non distended, no masses, no organomegaly, good bowel sounds, no rebound or guarding, no peritoneal signs.   EXTREMITIES: Atraumatic. Full range of motion on both upper and lower extremities, there is no tenderness to palpation, has pedal edema, " aged arthritic  joint swelling.  SKIN: Warm and dry, no erythema noted, no rashes or lesions.  NEUROLOGICAL: The patient is oriented to person, place and time. Strength and sensation are grossly intact. Face is symmetric.              LABS:  All labs reviewed in the nursing home record.        DISCHARGE PLAN: DC home with Hospice services, needs 02 at [facility], they have delivered to facility. Nursing to provide family on education with meds on dc including insulin, was not on insulin prior to dc.     Patient will follow up with PCP within 7- days after discharge for medication mangagment and appropriate lab studies.      Post Discharge Medication Reconciliation Status: discharge medications reconciled and changed, per note/orders (see AVS)    Electronically signed by:  Fe Alvarado CNP  This progress note was completed using Dragon software and there may be grammatical errors.      For documentation purposes, chart review, medication management, and discharge coordination of care was greater than 35 minutes